# Patient Record
Sex: MALE | Race: WHITE
[De-identification: names, ages, dates, MRNs, and addresses within clinical notes are randomized per-mention and may not be internally consistent; named-entity substitution may affect disease eponyms.]

---

## 2019-09-04 ENCOUNTER — HOSPITAL ENCOUNTER (OUTPATIENT)
Dept: HOSPITAL 53 - M CARPUL | Age: 24
End: 2019-09-04
Attending: STUDENT IN AN ORGANIZED HEALTH CARE EDUCATION/TRAINING PROGRAM
Payer: COMMERCIAL

## 2019-09-04 DIAGNOSIS — R06.00: Primary | ICD-10-CM

## 2019-09-04 PROCEDURE — 94070 EVALUATION OF WHEEZING: CPT

## 2019-09-04 PROCEDURE — 94729 DIFFUSING CAPACITY: CPT

## 2019-09-04 PROCEDURE — 94726 PLETHYSMOGRAPHY LUNG VOLUMES: CPT

## 2019-09-04 PROCEDURE — 94010 BREATHING CAPACITY TEST: CPT

## 2019-09-04 NOTE — PFTRPT
Height: 70.00  Inches  Weight: 190.00  Lbs  BSA: 2.04

Diagnosis: DYSPNEA, UNSPECIFIED



DATE OF PROCEDURE:  09/04/2019



ORDERED BY:  Dio West 



INTERPRETATION:  Excellent technical quality.  Under protocol, methacholine was 
administered.  At a dose of 2.5 mg (13.875 CDUs), a 34% decline in the FEV1 was 
noted.  PC of 0.71 is significant.  Flow rates did return to baseline post 
bronchodilator administration.



IMPRESSION:  Positive methacholine challenge study.

MTDD

## 2019-09-04 NOTE — PFTRPT
Height: 70.00  Inches  Weight: 190.00  Lbs  BSA: 2.04

Diagnosis: DYSPNEA, UNSPECIFIED



DATE OF PROCEDURE:  09/04/2019



ORDERED BY:  Dio West 



Spirometry:  Excellent technical quality.  Forced vital capacity normal.  FEV1 
generally in proportion.  Obstructive index is, therefore, normal.



Flow Volume Loop:  Expiratory limb of the flow volume loop suggests reasonable 
flow rates.



Lung Volumes:  Total lung capacity normal.  Residual volume in proportion.



Diffusing Capacity:  Diffusing capacity normal.



Hemoglobin:  No hemoglobin available for correction.



Airway Mechanics:  Airway resistance minimally elevated with concomitant 
decrease in airway conductance.



IMPRESSION:  Probably normal study.

MTDD